# Patient Record
Sex: MALE | Race: ASIAN | NOT HISPANIC OR LATINO | URBAN - METROPOLITAN AREA
[De-identification: names, ages, dates, MRNs, and addresses within clinical notes are randomized per-mention and may not be internally consistent; named-entity substitution may affect disease eponyms.]

---

## 2019-03-27 ENCOUNTER — COMMUNICATION - HEALTHEAST (OUTPATIENT)
Dept: TELEHEALTH | Facility: CLINIC | Age: 44
End: 2019-03-27

## 2019-03-27 ENCOUNTER — COMMUNICATION - HEALTHEAST (OUTPATIENT)
Dept: FAMILY MEDICINE | Facility: CLINIC | Age: 44
End: 2019-03-27

## 2019-03-27 ENCOUNTER — OFFICE VISIT - HEALTHEAST (OUTPATIENT)
Dept: FAMILY MEDICINE | Facility: CLINIC | Age: 44
End: 2019-03-27

## 2019-03-27 ENCOUNTER — HOSPITAL ENCOUNTER (OUTPATIENT)
Dept: CT IMAGING | Facility: HOSPITAL | Age: 44
Discharge: HOME | End: 2019-03-27
Attending: FAMILY MEDICINE

## 2019-03-27 DIAGNOSIS — R05.9 COUGH: ICD-10-CM

## 2019-03-27 DIAGNOSIS — C62.90 MALIGNANT NEOPLASM OF TESTIS, UNSPECIFIED LATERALITY, UNSPECIFIED WHETHER DESCENDED OR UNDESCENDED: ICD-10-CM

## 2019-03-27 LAB
AFP SERPL-MCNC: 3 UG/ML
ALBUMIN SERPL-MCNC: 4 G/DL (ref 3.5–5)
ALP SERPL-CCNC: 56 U/L (ref 45–120)
ALT SERPL W P-5'-P-CCNC: 195 U/L (ref 0–45)
ANION GAP SERPL CALCULATED.3IONS-SCNC: 11 MMOL/L (ref 5–18)
AST SERPL W P-5'-P-CCNC: 116 U/L (ref 0–40)
BILIRUB SERPL-MCNC: 1 MG/DL (ref 0–1)
BUN SERPL-MCNC: 9 MG/DL (ref 8–22)
CALCIUM SERPL-MCNC: 9.4 MG/DL (ref 8.5–10.5)
CHLORIDE BLD-SCNC: 100 MMOL/L (ref 98–107)
CO2 SERPL-SCNC: 29 MMOL/L (ref 22–31)
CREAT SERPL-MCNC: 0.92 MG/DL (ref 0.7–1.3)
ERYTHROCYTE [DISTWIDTH] IN BLOOD BY AUTOMATED COUNT: 12.6 % (ref 11–14.5)
GFR SERPL CREATININE-BSD FRML MDRD: >60 ML/MIN/1.73M2
GLUCOSE BLD-MCNC: 77 MG/DL (ref 70–125)
HCT VFR BLD AUTO: 49 % (ref 40–54)
HGB BLD-MCNC: 16 G/DL (ref 14–18)
MCH RBC QN AUTO: 28.2 PG (ref 27–34)
MCHC RBC AUTO-ENTMCNC: 32.7 G/DL (ref 32–36)
MCV RBC AUTO: 86 FL (ref 80–100)
PLATELET # BLD AUTO: 158 THOU/UL (ref 140–440)
PMV BLD AUTO: 7.9 FL (ref 7–10)
POTASSIUM BLD-SCNC: 4.1 MMOL/L (ref 3.5–5)
PROT SERPL-MCNC: 6.9 G/DL (ref 6–8)
RBC # BLD AUTO: 5.69 MILL/UL (ref 4.4–6.2)
SODIUM SERPL-SCNC: 140 MMOL/L (ref 136–145)
WBC: 3.5 THOU/UL (ref 4–11)

## 2019-03-27 RX ORDER — TESTOSTERONE CYPIONATE 200 MG/ML
100 INJECTION, SOLUTION INTRAMUSCULAR
Status: SHIPPED | COMMUNITY
Start: 2019-03-08

## 2019-03-27 ASSESSMENT — MIFFLIN-ST. JEOR: SCORE: 2097.46

## 2019-03-28 ENCOUNTER — COMMUNICATION - HEALTHEAST (OUTPATIENT)
Dept: ONCOLOGY | Facility: HOSPITAL | Age: 44
End: 2019-03-28

## 2019-03-28 ENCOUNTER — AMBULATORY - HEALTHEAST (OUTPATIENT)
Dept: FAMILY MEDICINE | Facility: CLINIC | Age: 44
End: 2019-03-28

## 2019-03-28 DIAGNOSIS — R91.8 PULMONARY NODULES: ICD-10-CM

## 2019-03-29 ENCOUNTER — COMMUNICATION - HEALTHEAST (OUTPATIENT)
Dept: FAMILY MEDICINE | Facility: CLINIC | Age: 44
End: 2019-03-29

## 2019-03-31 ENCOUNTER — COMMUNICATION - HEALTHEAST (OUTPATIENT)
Dept: FAMILY MEDICINE | Facility: CLINIC | Age: 44
End: 2019-03-31

## 2019-04-01 ENCOUNTER — AMBULATORY - HEALTHEAST (OUTPATIENT)
Dept: ONCOLOGY | Facility: HOSPITAL | Age: 44
End: 2019-04-01

## 2019-04-10 ENCOUNTER — OFFICE VISIT - HEALTHEAST (OUTPATIENT)
Dept: ONCOLOGY | Facility: CLINIC | Age: 44
End: 2019-04-10

## 2019-04-10 DIAGNOSIS — R91.8 LUNG INFILTRATE ON CT: ICD-10-CM

## 2019-04-10 DIAGNOSIS — Z85.47 HISTORY OF PRIMARY TESTICULAR CANCER: ICD-10-CM

## 2019-04-10 DIAGNOSIS — R74.8 ELEVATED LIVER ENZYMES: ICD-10-CM

## 2019-04-10 ASSESSMENT — MIFFLIN-ST. JEOR: SCORE: 2081.01

## 2019-05-02 ENCOUNTER — AMBULATORY - HEALTHEAST (OUTPATIENT)
Dept: LAB | Facility: CLINIC | Age: 44
End: 2019-05-02

## 2019-05-02 DIAGNOSIS — E29.1 MALE HYPOGONADISM: ICD-10-CM

## 2019-05-03 ENCOUNTER — HOSPITAL ENCOUNTER (OUTPATIENT)
Dept: CT IMAGING | Facility: CLINIC | Age: 44
Setting detail: RADIATION/ONCOLOGY SERIES
Discharge: STILL ADMITTED OR ADMINISTRATIVE | End: 2019-05-03
Attending: INTERNAL MEDICINE

## 2019-05-03 ENCOUNTER — AMBULATORY - HEALTHEAST (OUTPATIENT)
Dept: INFUSION THERAPY | Facility: CLINIC | Age: 44
End: 2019-05-03

## 2019-05-03 DIAGNOSIS — R91.8 LUNG INFILTRATE ON CT: ICD-10-CM

## 2019-05-03 DIAGNOSIS — R74.8 ELEVATED LIVER ENZYMES: ICD-10-CM

## 2019-05-03 DIAGNOSIS — E29.1 MALE HYPOGONADISM: ICD-10-CM

## 2019-05-03 DIAGNOSIS — Z85.47 HISTORY OF PRIMARY TESTICULAR CANCER: ICD-10-CM

## 2019-05-03 LAB
ALBUMIN SERPL-MCNC: 4 G/DL (ref 3.5–5)
ALP SERPL-CCNC: 50 U/L (ref 45–120)
ALT SERPL W P-5'-P-CCNC: 36 U/L (ref 0–45)
ANION GAP SERPL CALCULATED.3IONS-SCNC: 8 MMOL/L (ref 5–18)
AST SERPL W P-5'-P-CCNC: 18 U/L (ref 0–40)
BASOPHILS # BLD AUTO: 0 THOU/UL (ref 0–0.2)
BASOPHILS NFR BLD AUTO: 0 % (ref 0–2)
BILIRUB SERPL-MCNC: 1 MG/DL (ref 0–1)
BUN SERPL-MCNC: 8 MG/DL (ref 8–22)
CALCIUM SERPL-MCNC: 9.9 MG/DL (ref 8.5–10.5)
CHLORIDE BLD-SCNC: 103 MMOL/L (ref 98–107)
CO2 SERPL-SCNC: 29 MMOL/L (ref 22–31)
CREAT SERPL-MCNC: 0.83 MG/DL (ref 0.7–1.3)
EOSINOPHIL # BLD AUTO: 0.1 THOU/UL (ref 0–0.4)
EOSINOPHIL NFR BLD AUTO: 1 % (ref 0–6)
ERYTHROCYTE [DISTWIDTH] IN BLOOD BY AUTOMATED COUNT: 13.4 % (ref 11–14.5)
FASTING STATUS PATIENT QL REPORTED: YES
GFR SERPL CREATININE-BSD FRML MDRD: >60 ML/MIN/1.73M2
GLUCOSE BLD-MCNC: 96 MG/DL (ref 70–125)
HBV CORE AB SERPL QL IA: NEGATIVE
HBV SURFACE AG SERPL QL IA: NEGATIVE
HCT VFR BLD AUTO: 46.9 % (ref 40–54)
HCV AB SERPL QL IA: NEGATIVE
HDLC SERPL-MCNC: 44 MG/DL
HEPATITIS B SURFACE ANTIBODY LHE- HISTORICAL: POSITIVE
HGB BLD-MCNC: 15.2 G/DL (ref 14–18)
LDH SERPL L TO P-CCNC: 128 U/L (ref 125–220)
LYMPHOCYTES # BLD AUTO: 2 THOU/UL (ref 0.8–4.4)
LYMPHOCYTES NFR BLD AUTO: 31 % (ref 20–40)
MCH RBC QN AUTO: 28.6 PG (ref 27–34)
MCHC RBC AUTO-ENTMCNC: 32.4 G/DL (ref 32–36)
MCV RBC AUTO: 88 FL (ref 80–100)
MONOCYTES # BLD AUTO: 0.5 THOU/UL (ref 0–0.9)
MONOCYTES NFR BLD AUTO: 7 % (ref 2–10)
NEUTROPHILS # BLD AUTO: 4 THOU/UL (ref 2–7.7)
NEUTROPHILS NFR BLD AUTO: 61 % (ref 50–70)
PLATELET # BLD AUTO: 222 THOU/UL (ref 140–440)
PMV BLD AUTO: 9.7 FL (ref 8.5–12.5)
POTASSIUM BLD-SCNC: 4.4 MMOL/L (ref 3.5–5)
PROT SERPL-MCNC: 7.5 G/DL (ref 6–8)
PSA SERPL-MCNC: 1.9 NG/ML (ref 0–2.5)
RBC # BLD AUTO: 5.32 MILL/UL (ref 4.4–6.2)
SODIUM SERPL-SCNC: 140 MMOL/L (ref 136–145)
WBC: 6.6 THOU/UL (ref 4–11)

## 2019-05-06 LAB — HCG-TM SERPL-ACNC: <3 IU/L

## 2019-05-07 ENCOUNTER — OFFICE VISIT - HEALTHEAST (OUTPATIENT)
Dept: ONCOLOGY | Facility: CLINIC | Age: 44
End: 2019-05-07

## 2019-05-07 DIAGNOSIS — Z85.47 HISTORY OF PRIMARY TESTICULAR CANCER: ICD-10-CM

## 2019-05-07 DIAGNOSIS — R91.8 LUNG INFILTRATE ON CT: ICD-10-CM

## 2019-05-07 DIAGNOSIS — R74.8 ELEVATED LIVER ENZYMES: ICD-10-CM

## 2019-05-07 LAB
SHBG SERPL-SCNC: 27 NMOL/L (ref 11–80)
TESTOST FREE SERPL-MCNC: 10.76 NG/DL (ref 4.7–24.4)
TESTOST SERPL-MCNC: 464 NG/DL (ref 240–950)

## 2019-11-20 ENCOUNTER — OFFICE VISIT - HEALTHEAST (OUTPATIENT)
Dept: FAMILY MEDICINE | Facility: CLINIC | Age: 44
End: 2019-11-20

## 2019-11-20 DIAGNOSIS — K64.4 EXTERNAL HEMORRHOIDS: ICD-10-CM

## 2019-11-20 DIAGNOSIS — R53.82 CHRONIC FATIGUE: ICD-10-CM

## 2019-11-20 LAB
ANION GAP SERPL CALCULATED.3IONS-SCNC: 11 MMOL/L (ref 5–18)
BUN SERPL-MCNC: 8 MG/DL (ref 8–22)
CALCIUM SERPL-MCNC: 9.5 MG/DL (ref 8.5–10.5)
CHLORIDE BLD-SCNC: 99 MMOL/L (ref 98–107)
CO2 SERPL-SCNC: 29 MMOL/L (ref 22–31)
CREAT SERPL-MCNC: 0.95 MG/DL (ref 0.7–1.3)
ERYTHROCYTE [DISTWIDTH] IN BLOOD BY AUTOMATED COUNT: 13.8 % (ref 11–14.5)
GFR SERPL CREATININE-BSD FRML MDRD: >60 ML/MIN/1.73M2
GLUCOSE BLD-MCNC: 80 MG/DL (ref 70–125)
HCT VFR BLD AUTO: 50 % (ref 40–54)
HGB BLD-MCNC: 17.1 G/DL (ref 14–18)
MCH RBC QN AUTO: 29.2 PG (ref 27–34)
MCHC RBC AUTO-ENTMCNC: 34.2 G/DL (ref 32–36)
MCV RBC AUTO: 85 FL (ref 80–100)
PLATELET # BLD AUTO: 234 THOU/UL (ref 140–440)
PMV BLD AUTO: 7.6 FL (ref 7–10)
POTASSIUM BLD-SCNC: 4.2 MMOL/L (ref 3.5–5)
RBC # BLD AUTO: 5.86 MILL/UL (ref 4.4–6.2)
SODIUM SERPL-SCNC: 139 MMOL/L (ref 136–145)
TSH SERPL DL<=0.005 MIU/L-ACNC: 2.35 UIU/ML (ref 0.3–5)
WBC: 9.1 THOU/UL (ref 4–11)

## 2019-11-22 ENCOUNTER — COMMUNICATION - HEALTHEAST (OUTPATIENT)
Dept: FAMILY MEDICINE | Facility: CLINIC | Age: 44
End: 2019-11-22

## 2020-03-20 ENCOUNTER — COMMUNICATION - HEALTHEAST (OUTPATIENT)
Dept: ADMINISTRATIVE | Facility: HOSPITAL | Age: 45
End: 2020-03-20

## 2020-05-11 ENCOUNTER — COMMUNICATION - HEALTHEAST (OUTPATIENT)
Dept: ADMINISTRATIVE | Facility: HOSPITAL | Age: 45
End: 2020-05-11

## 2020-07-21 ENCOUNTER — COMMUNICATION - HEALTHEAST (OUTPATIENT)
Dept: FAMILY MEDICINE | Facility: CLINIC | Age: 45
End: 2020-07-21

## 2020-07-23 ENCOUNTER — OFFICE VISIT - HEALTHEAST (OUTPATIENT)
Dept: FAMILY MEDICINE | Facility: CLINIC | Age: 45
End: 2020-07-23

## 2020-07-23 DIAGNOSIS — K64.9 HEMORRHOIDS, UNSPECIFIED HEMORRHOID TYPE: ICD-10-CM

## 2020-09-15 ENCOUNTER — RECORDS - HEALTHEAST (OUTPATIENT)
Dept: ADMINISTRATIVE | Facility: OTHER | Age: 45
End: 2020-09-15

## 2021-03-19 ENCOUNTER — COMMUNICATION - HEALTHEAST (OUTPATIENT)
Dept: ADMINISTRATIVE | Facility: HOSPITAL | Age: 46
End: 2021-03-19

## 2021-05-27 NOTE — CONSULTS
[facility]ematology [facility] Oncology Consult Note    Patient: Allan Moran  MRN: [id number]  Date of Service: 04/10/2019        Reason for Visit    I was consulted by Dr. Larry regarding testicular cancer.    Assessment/Plan    Mr. Allan Moran is a 43 y.o. 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  He received 3 cycles of cisplatin and etoposide chemotherapy as adjuvant treatment but did not take the fourth cycle because he found it difficult to tolerate.  He was being followed up initially in [place] and later with [place] [facility] and in 2013 with no evidence of recurrence.  He now presents with complaints of a cough that has been going on for the last 3 months with some shortness of breath.  He also had some low-grade fevers and night sweats in February which have now subsided.  I have gone through the available previous medical records from the [facility].  I have reviewed his labs and I have personally reviewed the images of the CT scan of the chest.    1.  I discussed with the patient that he has done very well from the testicular cancer point of view.  He is now about 14 years out from the diagnosis.  While I cannot say that the chance of recurrence of the cancer is 0, it is very close to that.  At this point at the most what I would recommend would be follow-up once a year for the testicular cancer, which if he is interested I can take care of.  He was agreeable to the plan.    2.  The real issue in today's appointment are the lung infiltrates seen in the CT scan of the chest done on 3/27/19.  Reviewed the images of the CT scan of the chest with him and his wife.  He has scattered groundglass opacities bilaterally.  Another issue is the liver enzyme elevation noted in the CMP from 3/27/19.  When I look through his  previous records the last time he has had liver enzymes checked was in 2012 and that they were normal.  I agree with the patient that we cannot just attribute this to a fatty liver.  From the history that he gives me, I suspect that he had some sort of respiratory infection in January to March time and he is recovering slowly from that.  He probably has a residual cough and infiltrates in the CT scan from the treated respiratory infection.  That may explain the liver enzyme elevation also.  We discussed about how to manage this.  He already has a referral to pulmonary but he has not yet made that appointment.  Finally he agreed to follow-up with me again in about 3 weeks time with a repeat CT scan and labs.    3.  I have ordered a CT scan of the chest abdomen and pelvis with oral and IV contrast to be done in 3 weeks.  When he comes for the CT scan to have the following labs drawn: CBC differential platelets, CMP, LDH, beta hCG, hepatitis B surface antigen, hepatitis C antibody and hepatitis B chronic evaluation.  He will see me in follow-up a day or 2 after the CT scan is done.      ECOG Performance   ECOG Performance Status: 1(past few months due to back pain and cough)  Distress Assessment  Distress Assessment Score: 1        Problem List    1. History of primary testicular cancer  HM1(CBC and Differential)    Comprehensive Metabolic Panel    Lactate Dehydrogenase (LDH)    Beta-hCG Tumor Marker    CT Chest Abdomen Pelvis With Oral With IV Cont   2. Lung infiltrate on CT  HM1(CBC and Differential)    Comprehensive Metabolic Panel    Lactate Dehydrogenase (LDH)    Beta-hCG Tumor Marker    CT Chest Abdomen Pelvis With Oral With IV Cont   3. Elevated liver enzymes  HM1(CBC and Differential)    Comprehensive Metabolic Panel    Lactate Dehydrogenase (LDH)    CT Chest Abdomen Pelvis With Oral With IV Cont    Hepatitis B Surface Antigen (HBsAG)    Hepatitis C Antibody (Anti-HCV)    Hepatitis B Chronic Evaluation            CC: Sharona Larry MD      ______________________________________________________________________________      History    Mr. Allan Moran is a 43-year-old gentleman who had a testicular cancer, which he remembers is on the right side that was diagnosed in May 2005.  Apparently it was his wife who noticed the swelling.  He has a history of having surgery done for an undescended testis when he was 3 years old in [facility].  He does not know exactly whether it was an orchiopexy or orchiectomy that was done.  He believes it was on the left side.  In any case he had the right-sided radical orchiectomy done on the Tuesday after Memorial Day in 2005 (May 31, 2005).  This was in [facility].  We do not have the original records from that time.  According to what he and his wife can remember this was a mixed nonseminomatous germ cell tumor.  According to the records we have available from [place] [facility] this comprised of 90% embryonal carcinoma and a 10% teratoma with him approximately adenocarcinoma in the vascular spaces.  He was recommended to have 4 cycles of -16 and cisplatin.  He received 3 cycles and then decided to stop chemotherapy because he found it difficult to tolerate due to dizziness and low blood pressure.  He was trying to work through all this time.  He had a follow-up on a regular basis when he was in [place].  After he and his family moved to [place], he followed up with [place] [facility] and 2013 with no evidence of recurrence.    He says that he started feeling unwell in January of this year.  He believes he had the flu in January.  He feels that he had other respiratory infection in early March.  He has been coughing since January.  He has been treated with one course of Augmentin in January and then again with a Z-Cabrera in March.  He went to the urgent care a couple weeks ago and was given a course of prednisone which she took only 2 tablets.    He says that  the cough was mostly in the morning.  He is still coughing but less intensely.  He does bring up some phlegm which is yellowish in the morning but then clears up later in the day.  He did have some shortness of breath that is more noticeable in January to February.  Breathing is improved now.  Occasionally he has had some mild wheezing.  He says it in the very beginning there was a little bit of blood streaking in the phlegm but that is not there now anymore.  He thinks that he had some low-grade fevers and night sweats in February which have gone away now.    No lumps or bumps anywhere.  No aches or pains anywhere.  No unusual headaches.  No eyesight problems.  No mouth sores.  No swallowing difficulty.  No nausea or vomiting.  No abdominal pain.  No chest pain.  No palpitation.  No constipation or diarrhea.  No blood in stool or black stools.  No difficulty with urination.  No skin rashes.  No numbness or tingling.    Please see below.  A 14 point review of system is otherwise completely negative.    Past History  Past Medical History:   Diagnosis Date     Dyslipidemia      Erectile dysfunction      Obstructive sleep apnea 2008     Seasonal allergies      Undescended testes     removed at age 3.     Past Surgical History:   Procedure Laterality Date     CYSTOSCOPY  2007     benign bladder polyp,status post resection     EXCISION BACK CYST AND CHEST NEVUS  02/06/2015     RADICAL ORCHIECTOMY Right 05/31/2005     UNDESCENDED TESTICLE EXPLORATION      Age 3.  Removal of undescended testis.     WISDOM TOOTH EXTRACTION  2009     Family History   Problem Relation Age of Onset     Arthritis Mother      Heart disease Father      No Medical Problems Sister      No Medical Problems Son      No Medical Problems Son      Other Daughter 13        Novak syndrome.     No Medical Problems Sister      Social History     Socioeconomic History     Marital status:      Spouse name: None     Number of children: None     Years of  education: None     Highest education level: None   Occupational History     Occupation:  for a [employer].   Social Needs     Financial resource strain: None     Food insecurity:     Worry: None     Inability: None     Transportation needs:     Medical: None     Non-medical: None   Tobacco Use     Smoking status: Former Smoker     Packs/day: 0.10     Years: 2.00     Pack years: 0.20     Types: Cigarettes     Smokeless tobacco: Never Used   Substance and Sexual Activity     Alcohol use: No     Drug use: No     Sexual activity: None   Lifestyle     Physical activity:     Days per week: None     Minutes per session: None     Stress: None   Relationships     Social connections:     Talks on phone: None     Gets together: None     Attends Nondenominational service: None     Active member of club or organization: None     Attends meetings of clubs or organizations: None     Relationship status: None     Intimate partner violence:     Fear of current or ex partner: None     Emotionally abused: None     Physically abused: None     Forced sexual activity: None   Other Topics Concern     None   Social History Narrative    Born in [facility] in [facility].  Came to the [place] when he was 6 years old.     Allergies    No Known Allergies    Review of Systems    General  General (WDL): Exceptions to WDL  Fatigue: None  Fever: None  Generalized Muscle Weakness: None  Weight Loss: No  ENT  ENT (WDL): Exceptions to WDL  Vertigo (Dizziness): None  Changes in vision: None  Glasses or Contacts: Yes - Chronic (Greater than 3 months)  Hearing loss: None  Hearing Aids: None  Tinnitus: None  Pain/Pressure in ears: None  Sinus Congestion/Drainage: Yes - Recent (Less than 3 months)  Sore Throat: None  Dental Problems: None  Dentures: None  Respiratory  Respiratory (WDL): Exceptions to WDL  Dyspnea: None  hemoptysis: None  Is patient on O2?: None  Cough: Yes - Chronic (Greater than 3 months)  Non-Cardiac Chest Pain:  "None  Cardiovascular  Cardiovascular (WDL): All cardiovascular elements are within defined limits  Endocrine  Endocrine (WDL): All endocrine elements are within defined limits  Gastrointestinal  Gastrointestinal (WDL): Exceptions to WDL  Difficulty Swallowing: None  Heartburn: None  Constipation: None  Yellowish skin and/or eyes: None  Blood from rectum: None  Nausea and Vomiting: None  Abdominal Pain: None  Diarrhea: None  Have had black or tan stools?: None  Hemorrhoids: Yes - Recent (Less than 3 months)  Poor Appetite: None  Musculoskeletal  Musculoskeletal (WDL): All musculoskeletal elements are within defined limits  Range of Motion Limitation: None  Joint pain: None  Back Pain: Yes - Chronic (Greater than 3 months)  Activity Assistance: None  Difficulty to lie flat for more than 30 minutes: None  Pain interfering with walking: Yes - Recent (Less than 3 months)  Muscle pain or stiffness: Yes - Recent (Less than 3 months)  Recent fall: None  Assistive device: None  Neurological  Neurological (WDL): All neurological elements are within defined limits  History of LOC?: None  Headaches: None  Difficulty walking: None  Difficulty with speech: None  Difficulty with memory: None  Vertigo (Dizziness): None  Dominant Hand: Right  Seizures: None  Difficulty with Balance: None  Numbness and/or tingling: None  Psychological/Emotional  Psychological/Emotional (WDL): All psychological/emotional elements are within defined limits  Hematological/Lymphatic  Hematological/Lymphatic (WDL): All hematological/lymphatic elements are within defined limits  Dermatological  Dermatologic (WDL): All dermatological elements are within defined limits  Genitourinary/Reproductive  Genitourinary/Reproductive (WDL): All genitourinary/reproductive elements are within defined limits  Reproductive (Females only)     Pain  Currently in Pain: No/denies    Physical Exam    Recent Vitals 4/10/2019   Height 6' 0\"   Weight 257 lbs 8 oz   BSA (m2) 2.44 " m2   /65   Pulse 91   Temp 97.9   Temp src 1   SpO2 93   Some recent data might be hidden       GENERAL: Alert and oriented. Seated comfortably. In no distress.  Heavy build.    HEAD: Atraumatic and normocephalic.  He has male pattern baldness.    EYES: MCKENZIE, EOMI.  No pallor.  No icterus.    Oral cavity: no mucosal lesion or tonsillar enlargement.    NECK: supple. JVP normal.  No thyroid enlargement.    LYMPH NODES: No palpable, cervical, axillary or inguinal lymphadenopathy.    CHEST: clear to auscultation bilaterally.  Resonant to percussion throughout bilaterally.  Symmetrical breath movements bilaterally.    CVS: S1 and S2 are heard. Regular rate and rhythm.  No murmur or gallop or rub heard.    ABDOMEN: Soft. Not tender. Not distended.  No palpable hepatomegaly or splenomegaly.  No other mass palpable.  Bowel sounds heard.  Internal genitalia: No evidence of hernia in the inguinal regions.  Uncircumcised penis.  Bilateral scrotum appears empty to palpation.    EXTREMITIES: Warm.  No peripheral edema.    SKIN: no rash, or bruising or purpura.      Lab Results    No visits with results within 2 Week(s) from this visit.   Latest known visit with results is:   Office Visit on 03/27/2019   Component Date Value Ref Range Status     WBC 03/27/2019 3.5* 4.0 - 11.0 thou/uL Final     RBC 03/27/2019 5.69  4.40 - 6.20 mill/uL Final     Hemoglobin 03/27/2019 16.0  14.0 - 18.0 g/dL Final     Hematocrit 03/27/2019 49.0  40.0 - 54.0 % Final     MCV 03/27/2019 86  80 - 100 fL Final     MCH 03/27/2019 28.2  27.0 - 34.0 pg Final     MCHC 03/27/2019 32.7  32.0 - 36.0 g/dL Final     RDW 03/27/2019 12.6  11.0 - 14.5 % Final     Platelets 03/27/2019 158  140 - 440 thou/uL Final     MPV 03/27/2019 7.9  7.0 - 10.0 fL Final     Sodium 03/27/2019 140  136 - 145 mmol/L Final     Potassium 03/27/2019 4.1  3.5 - 5.0 mmol/L Final     Chloride 03/27/2019 100  98 - 107 mmol/L Final     CO2 03/27/2019 29  22 - 31 mmol/L Final     Anion  Gap, Calculation 03/27/2019 11  5 - 18 mmol/L Final     Glucose 03/27/2019 77  70 - 125 mg/dL Final     BUN 03/27/2019 9  8 - 22 mg/dL Final     Creatinine 03/27/2019 0.92  0.70 - 1.30 mg/dL Final     GFR MDRD Af Amer 03/27/2019 >60  >60 mL/min/1.73m2 Final     GFR MDRD Non Af Amer 03/27/2019 >60  >60 mL/min/1.73m2 Final     Bilirubin, Total 03/27/2019 1.0  0.0 - 1.0 mg/dL Final     Calcium 03/27/2019 9.4  8.5 - 10.5 mg/dL Final     Protein, Total 03/27/2019 6.9  6.0 - 8.0 g/dL Final     Albumin 03/27/2019 4.0  3.5 - 5.0 g/dL Final     Alkaline Phosphatase 03/27/2019 56  45 - 120 U/L Final     AST 03/27/2019 116* 0 - 40 U/L Final     ALT 03/27/2019 195* 0 - 45 U/L Final     AFP-Tumor Marker 03/27/2019 3.0  <=8.5 ug/mL Final         Imaging Results    Ct Chest With Contrast    Result Date: 3/27/2019  EXAM: CT CHEST W CONTRAST LOCATION: [facility] DATE/TIME: 3/27/2019 4:17 PM INDICATION: Cough and inflammation for three months with HX testicular CA COMPARISON: Chest x-ray 03/22/2019 TECHNIQUE: Helical images were obtained through the chest during injection of IV contrast. Multiplanar reformats were obtained. Dose reduction techniques were used. IV CONTRAST: Iohexol (Omni) 90 mL FINDINGS: LUNGS AND PLEURA: Numerous small scattered groundglass opacities throughout both upper and lower lobes. No effusions. No evidence for septal thickening or bronchiectasis. No significant air trapping. MEDIASTINUM: Small calcified subcarinal lymph node. No enlarged mediastinal or hilar lymph nodes. Prominent collateral vessels within the anterior mediastinum. LIMITED UPPER ABDOMEN: Diffuse fatty infiltration of the liver. MUSCULOSKELETAL: Negative.     CONCLUSION: 1.  Numerous scattered groundglass nodules throughout both lungs concerning for pneumonia or inflammatory process. Recommend follow-up CT chest exam after therapy to assess for resolution. 2.  No adenopathy in the chest. 3.  Fatty liver.        Signed by: Paula Nova  MD

## 2021-05-27 NOTE — PROGRESS NOTES
Please tell pt no cancer seen on CT but did see inflammation.  I'd  like him to f/u with pulmonology.  Order in.

## 2021-05-27 NOTE — TELEPHONE ENCOUNTER
----- Message from Sharona Larry MD sent at 3/28/2019  8:11 AM CDT -----  Please tell pt no cancer seen on CT but did see inflammation.  I'd  like him to f/u with pulmonology.  Order in.

## 2021-05-27 NOTE — TELEPHONE ENCOUNTER
Allan was referred over for his hx of testicular cancer.  He was dx in 2005 at [facility] in [facility] with an orchiectomy.  He went on to have chemotherapy.  He followed up at [facility] [place] and then on to [place] [facility].  PCP is referring over.  I reviewed my role and encouraged he call me if needed for assistance.

## 2021-05-27 NOTE — PROGRESS NOTES
Allan requested that he see Dr. Nova instead of Dr. Oglesby as he is covered by his insurance plan.  I have changed his appt.  I let him know via email we need his LETICIA asa to get his old records.  Payal Talley has been working on retrieving from 2005, his orchiectomy and chemotherapy.  She was told the [facility] no longer has these records.  She will proceed with getting [place] [facility] records.    [address] [place] [address]  [phone]  Dr Balbir Min, Oncologist       Seamus Vieira MD - Urologist   [phone]   no

## 2021-05-27 NOTE — PROGRESS NOTES
Chief Complaint   Patient presents with     URI     Pt c/o off and on coughing up bloody mucous, seen Friday at urgent care in [facility]. He was given Prednisone and a Z judd. He does not feel the prednisone helped him at all. He states he did not finish it. He did have a chest xray which showed negative for pneumonia.        HPI: Complex patient presents new to our clinic with history of proximally 3 months of cough.  Old records from [facility] dated 3/22/2019 shows that he had inflammation and bronchial thickening and lung inflammation.  He also has noted to these had episodes of hemoptysis, 6 pounds of weight loss.  His past history is reviewed notable for testicular cancer.  His concern is that his cough is not going away and that he may have something more serious going on in his lung.  Fortunately he does not smoke.    ROS: 10 point system review complete notable for nausea bloody mucus and frequent colds and occasional back pain.    SH:    reports that he has quit smoking. He does not have any smokeless tobacco history on file. He reports that he does not drink alcohol or use drugs.      FH: Reviewed notable for cancer in himself only    Meds:  Allan has a current medication list which includes the following prescription(s): testosterone cypionate and prednisone.    O:  /84   Pulse 93   Temp 98.4  F (36.9  C)   Resp 16   Ht 6' (1.829 m)   Wt (!) 261 lb 2 oz (118.4 kg)   SpO2 95%   BMI 35.41 kg/m     Constitutional:    --Vitals as above  --No acute distress  Eyes-  --Sclera noninjected  --Lids and conjunctiva normal  ENT-  --TMs clear  --Sclera noninjected  --Pharynx not erythematous  Neck-  --Neck supple with no cervical lymphadenopathy  Lungs-  --Clear to Auscultation  Heart-  --Regular rate and rhythm  Abdomen--  --Soft, non-tender, non-distended  Skin-  --Pink and dry  Psych-  --Alert and oriented  --Normal affect      A/P:   1. Cough  The patient has chronic cough with hemoptysis.  This,  along with 6 pound weight loss is concerning.  Because of his past history of abnormal chest x-ray will obtain CT scan of the chest.  - CT Chest With Contrast; Future  - HM2(CBC w/o Differential)  - Comprehensive Metabolic Panel    2. Malignant neoplasm of testis, unspecified laterality, unspecified whether descended or undescended (H)  Referral to oncology due to history of testicular cancer and will check AFP.  - Ambulatory referral to Oncology  - AFP-Tumor Marker

## 2021-05-28 NOTE — PROGRESS NOTES
Patient is here for follow-up of testicular cancer.   Had labs and CT scan done.  Accompanied by spouse, Kristin.  Mary Carmen Oshea RN

## 2021-05-28 NOTE — PATIENT INSTRUCTIONS - HE
EXAM: CT CHEST ABDOMEN PELVIS W ORAL W IV CONTRAST  LOCATION: [facility]  DATE/TIME: 5/3/2019 10:03 AM     INDICATION: Neoplasm: testicular, recurrence, suspected/known Patient with a history of testicular cancer resected in 2005. Now with bilateral lung infiltrates and LFT elevation.  COMPARISON: 3/27/2019 chest CT.  TECHNIQUE: Helical thin-section CT scan of the chest, abdomen, and pelvis was performed before and after injection of IV contrast. Multiplanar reformats were obtained. Dose reduction techniques were used.   CONTRAST: Iohexol (Omni) 100 mL.     FINDINGS:      CHEST: Prior bilateral irregular pulmonary opacities have essentially resolved (a few subtle regions of groundglass remain). No new pulmonary parenchymal abnormality. No adenopathy. No effusions.     ABDOMEN: Low-density liver without focal abnormality. Unremarkable gallbladder, pancreas, spleen and adrenals. No free air or adenopathy. Nonaneurysmal aorta with trace plaque.     PELVIS: Colonic diverticulosis. Normal appendix. No free fluid or adenopathy.     MUSCULOSKELETAL: No focal bony lesion. Couple tiny sclerotic foci are likely bone islands.     IMPRESSION:   CONCLUSION:     1.  Near-complete resolution of prior presumed infectious / inflammatory pulmonary opacities.     2.  No adenopathy in the chest, abdomen or pelvis.     3.  Diffuse hepatic steatosis. No focal lesion.

## 2021-05-28 NOTE — PROGRESS NOTES
[facility] Hematology and Oncology Progress Note    Patient: Allan Moran  MRN: [id number]  Date of Service: 05/07/2019        Reason for Visit    Follow-up regarding testicular cancer.    Assessment and Plan      ECOG Performance   ECOG Performance Status: 0    Distress Assessment  Distress Assessment Score: No distress    Pain   : None.    Mr. Allan Moran is a 43 y.o. 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  He was treated in [facility].  He had a radical orchiectomy done on 5/31/2005.  He received 3 cycles of cisplatin and etoposide chemotherapy as adjuvant treatment but did not take the fourth cycle because he found it difficult to tolerate.  He was being followed up in [place] and later with [place] oncology until 2013 with no evidence of recurrence.    He presented to [facility] oncology April 10, 2013.  He was complaining of a cough that was going on for 3 months and some shortness of breath.  He also gave a history of some low-grade fevers and night sweats in February which had now subsided.  He had had a CT scan of the chest done on 3/27/2019 which showed scattered bilateral groundglass opacities in the lungs.  He also had a mild liver enzyme elevation in the CMP done on 3/27/2019.    1.  He appears to be doing well overall.  The cough has cleared.  No longer has any fevers or night sweats.  He has gained weight.  Energy is good.    2.  I reviewed the images and the report of the CT scan of the chest abdomen and pelvis that was done on 5/3/2019 with the patient and his wife.  The groundglass opacities in the lungs have almost completely cleared.  There is really nothing else concerning found in the CT scan that looks like cancer recurrence or otherwise.  He was very glad to see the results.    3.  We then reviewed his labs from May 3.  Blood counts are essentially normal.   Metabolic panel was also normal.  The liver enzyme elevation has resolved.  Hepatitis B and C serologies have come back negative.  He does have antibodies to hepatitis B which likely reflects either exposure in the past or immunization.  He is not sure about that.  Tumor markers have all come back normal.  He was very glad to see the results.    4.  I discussed with him that I do not see any evidence of cancer recurrence.  I suspect that the groundglass opacities in the lung and the mild liver enzyme elevations that he had likely signify that he had some sort of respiratory infection which caused the groundglass opacities in the lungs.  This could have been a viral pneumonia.  Anyway it appears to have cleared.  I do not think any further treatment for that is needed at this time.    5.  I discussed with him that now is the time to focus on the rest of his health.  I think he needs to lose some weight for his overall health.  It is notable that there is a appearance of fatty liver in the CT scan.  I encouraged him to start exercising.  Encouraged him to follow-up with primary care for managing his overall health.    6.  I discussed with him about follow-up for the testicular cancer.  I told him that at this point the chance of recurrence of testicular cancer is quite low.  If he wants he can follow-up annually with his PCP.  If he wants he can go for an annual follow-up with oncology also.  He wanted to have an annual oncology follow-up for the testicular cancer.  We cannot do that for him.  Return to clinic in a years time to have labs drawn: CBC differential platelets, CMP, AFP level, LDH and beta hCG level.  To see me or Juan Francisco Haile NP in follow-up a week later.    Time spend >25 minutes face to face with the patient. More than 50 % in counseling and coordination of care.      Problem List    1. History of primary testicular cancer  HM1(CBC and Differential)    Comprehensive Metabolic Panel    Lactate  Dehydrogenase (LDH)    AFP-Tumor Marker    Beta-hCG Tumor Marker (HCGTM)   2. Lung infiltrate on CT     3. Elevated liver enzymes          CC: Sharona Larry MD    ______________________________________________________________________________    History of Present Illness    Mr. Allan Moran is here for follow-up with his wife.  He says that he is doing well overall.  The cough is completely clear.  Fatigue is gone.  He does not have any aches or pains anywhere.  No fevers or night sweats.  No lumps or bumps.  He is eating well.  I noticed that he has gained some weight.  He is just a little bit anxious to see the results of the CT scan and the blood work.    Please see below.  A 14 point review of system is otherwise completely negative.    Pain Status  Currently in Pain: No/denies    Review of Systems    Constitutional  Constitutional (WDL): All constitutional elements are within defined limits  Neurosensory  Neurosensory (WDL): All neurosensory elements are within defined limits  Cardiovascular  Cardiovascular (WDL): All cardiovascular elements are within defined limits  Pulmonary  Respiratory (WDL): Within Defined Limits  Gastrointestinal  Gastrointestinal (WDL): All gastrointestinal elements are within defined limits  Genitourinary  Genitourinary (WDL): All genitourinary elements are within defined limits  Integumentary  Integumentary (WDL): All integumentary elements are within defined limits  Patient Coping  Patient Coping: Open/discussion  Distress Assessment  Distress Assessment Score: No distress  Accompanied by  Accompanied by: Family Member    Past History  Past Medical History:   Diagnosis Date     Dyslipidemia      Erectile dysfunction      Obstructive sleep apnea 2008     Seasonal allergies      Undescended testes     removed at age 3.         Past Surgical History:   Procedure Laterality Date     CYSTOSCOPY  2007     benign bladder polyp,status post resection     EXCISION BACK CYST AND CHEST  NEVUS  02/06/2015     RADICAL ORCHIECTOMY Right 05/31/2005     UNDESCENDED TESTICLE EXPLORATION      Age 3.  Removal of undescended testis.     WISDOM TOOTH EXTRACTION  2009       Physical Exam    Recent Vitals 5/7/2019   Height -   Weight 263 lbs 10 oz   BSA (m2) 2.46 m2   /74   Pulse 86   Temp 98.7   Temp src 1   SpO2 95   Some recent data might be hidden       GENERAL: Alert and oriented. Seated comfortably. In no distress.  Heavyset.    HEAD: Atraumatic and normocephalic.      EYES: MCKENZIE, EOMI.  No pallor.  No icterus.    Oral cavity: no mucosal lesion or tonsillar enlargement.    NECK: supple. JVP normal.  No thyroid enlargement.    LYMPH NODES: No palpable, cervical, axillary or inguinal lymphadenopathy.    ABDOMEN: Soft. Not tender. Not distended.  No palpable hepatomegaly or splenomegaly.      EXTREMITIES: Warm.  No peripheral edema.    SKIN: no rash, or bruising or purpura.  He has male pattern baldness.        Lab Results    Recent Results (from the past 168 hour(s))   Comprehensive Metabolic Panel   Result Value Ref Range    Sodium 140 136 - 145 mmol/L    Potassium 4.4 3.5 - 5.0 mmol/L    Chloride 103 98 - 107 mmol/L    CO2 29 22 - 31 mmol/L    Anion Gap, Calculation 8 5 - 18 mmol/L    Glucose 96 70 - 125 mg/dL    BUN 8 8 - 22 mg/dL    Creatinine 0.83 0.70 - 1.30 mg/dL    GFR MDRD Af Amer >60 >60 mL/min/1.73m2    GFR MDRD Non Af Amer >60 >60 mL/min/1.73m2    Bilirubin, Total 1.0 0.0 - 1.0 mg/dL    Calcium 9.9 8.5 - 10.5 mg/dL    Protein, Total 7.5 6.0 - 8.0 g/dL    Albumin 4.0 3.5 - 5.0 g/dL    Alkaline Phosphatase 50 45 - 120 U/L    AST 18 0 - 40 U/L    ALT 36 0 - 45 U/L   Lactate Dehydrogenase (LDH)   Result Value Ref Range    LD (LDH) 128 125 - 220 U/L   HDL Cholesterol   Result Value Ref Range    HDL Cholesterol 44 >=40 mg/dL    Patient Fasting > 8hrs? Yes    HM1 (CBC with Diff)   Result Value Ref Range    WBC 6.6 4.0 - 11.0 thou/uL    RBC 5.32 4.40 - 6.20 mill/uL    Hemoglobin 15.2 14.0 - 18.0  g/dL    Hematocrit 46.9 40.0 - 54.0 %    MCV 88 80 - 100 fL    MCH 28.6 27.0 - 34.0 pg    MCHC 32.4 32.0 - 36.0 g/dL    RDW 13.4 11.0 - 14.5 %    Platelets 222 140 - 440 thou/uL    MPV 9.7 8.5 - 12.5 fL    Neutrophils % 61 50 - 70 %    Lymphocytes % 31 20 - 40 %    Monocytes % 7 2 - 10 %    Eosinophils % 1 0 - 6 %    Basophils % 0 0 - 2 %    Neutrophils Absolute 4.0 2.0 - 7.7 thou/uL    Lymphocytes Absolute 2.0 0.8 - 4.4 thou/uL    Monocytes Absolute 0.5 0.0 - 0.9 thou/uL    Eosinophils Absolute 0.1 0.0 - 0.4 thou/uL    Basophils Absolute 0.0 0.0 - 0.2 thou/uL   Hepatitis B Surface Antigen (HBsAG)   Result Value Ref Range    Hepatitis B Surface Ag Negative Negative   Hepatitis C Antibody (Anti-HCV)   Result Value Ref Range    Hepatitis C Ab Negative Negative   Hepatitis B Chronic Evaluation   Result Value Ref Range    Hep B Core Total Ab Negative Negative    Hep B Surface Antibody Positive (!) Negative   PSA, Annual Screen (Prostatic-Specific Antigen)   Result Value Ref Range    PSA 1.9 0.0 - 2.5 ng/mL       Imaging    Ct Chest Abdomen Pelvis With Oral With Iv Cont    Result Date: 5/3/2019  EXAM: CT CHEST ABDOMEN PELVIS W ORAL W IV CONTRAST LOCATION: [facility] DATE/TIME: 5/3/2019 10:03 AM INDICATION: Neoplasm: testicular, recurrence, suspected/known Patient with a history of testicular cancer resected in 2005. Now with bilateral lung infiltrates and LFT elevation. COMPARISON: 3/27/2019 chest CT. TECHNIQUE: Helical thin-section CT scan of the chest, abdomen, and pelvis was performed before and after injection of IV contrast. Multiplanar reformats were obtained. Dose reduction techniques were used. CONTRAST: Iohexol (Omni) 100 mL. FINDINGS: CHEST: Prior bilateral irregular pulmonary opacities have essentially resolved (a few subtle regions of groundglass remain). No new pulmonary parenchymal abnormality. No adenopathy. No effusions.  ABDOMEN: Low-density liver without focal abnormality. Unremarkable  gallbladder, pancreas, spleen and adrenals. No free air or adenopathy. Nonaneurysmal aorta with trace plaque. PELVIS: Colonic diverticulosis. Normal appendix. No free fluid or adenopathy. MUSCULOSKELETAL: No focal bony lesion. Couple tiny sclerotic foci are likely bone islands.     CONCLUSION: 1.  Near-complete resolution of prior presumed infectious / inflammatory pulmonary opacities. 2.  No adenopathy in the chest, abdomen or pelvis. 3.  Diffuse hepatic steatosis. No focal lesion.         Signed by: Paula Nova MD

## 2021-06-02 VITALS — BODY MASS INDEX: 35.37 KG/M2 | WEIGHT: 261.13 LBS | HEIGHT: 72 IN

## 2021-06-02 VITALS — WEIGHT: 257.5 LBS | HEIGHT: 72 IN | BODY MASS INDEX: 34.88 KG/M2

## 2021-06-03 VITALS — WEIGHT: 263.6 LBS | BODY MASS INDEX: 35.75 KG/M2

## 2021-06-03 VITALS
HEART RATE: 93 BPM | BODY MASS INDEX: 35.58 KG/M2 | SYSTOLIC BLOOD PRESSURE: 120 MMHG | DIASTOLIC BLOOD PRESSURE: 80 MMHG | RESPIRATION RATE: 16 BRPM | WEIGHT: 262.38 LBS | OXYGEN SATURATION: 98 %

## 2021-06-03 NOTE — PROGRESS NOTES
"Chief Complaint   Patient presents with     anal problem     Pt c/o growth at anus, denies pain or blood x 3 months     Adenopathy     Pt c/o lymphode swollen, chiropractor has felt it in his last 4 visits.       HPI: 43-year-old male with a past history of testicular cancer, and elevated LFTs, presents today with 2 issues.  The first is a \"mass\" in his rectum.  He is noticed that for the past several months.  Occasionally has rectal bleeding with defecation but not all the time.    He also has noticed increased fatigue.  Over the past 2 months he states \"I just have not felt myself\".  He can put his finger on it but he tells me that he feels fatigued.  He is taking over-the-counter allergy medicines including Allegra which she has done for quite some time.  He is concerned that it may be cancer related.  He would like his labs to be checked.  He has a past history of testicular cancer and is currently taking testosterone injections.    ROS: No abdominal pains.  No difficulty moving his urine.  Seasonal allergies are stable    SH:    reports that he has quit smoking. His smoking use included cigarettes. He has a 0.20 pack-year smoking history. He has never used smokeless tobacco. He reports that he does not drink alcohol or use drugs.      FH: The Patient's family history includes Arthritis in his mother; Heart disease in his father; No Medical Problems in his sister, sister, son, and son; Other (age of onset: 13) in his daughter.     Meds:  Allan has a current medication list which includes the following prescription(s): testosterone cypionate and hydrocortisone.    O:  /80   Pulse 93   Resp 16   Wt (!) 262 lb 6 oz (119 kg)   SpO2 98%   BMI 35.58 kg/m    Alert conversant no acute distress  Skin pink and dry  Neck is supple with no thyromegaly  Examination rectal area shows 2 nonthrombosed external hemorrhoids 1-7 o'clock and one at the 4 o'clock position which is minimally painful there is no active " bleeding.    A/P:   1. External hemorrhoids  Patient has external hemorrhoids.  Recommended Anusol HC cream.  Sitz bath.  Refrain from sitting on the toilet extended periods of time.  Discussed early colonoscopy at this point patient will hold.  - hydrocortisone (ANUSOL-HC) 2.5 % rectal cream; Apply rectally 2 times daily  Dispense: 30 g; Refill: 1    2. Chronic fatigue  We will check labs as listed.  - HM2(CBC w/o Differential)  - Basic Metabolic Panel  - Thyroid Stimulating Hormone (TSH)    3.  Seasonal allergies  -Continue Allegra      RTC 6 months

## 2021-06-03 NOTE — TELEPHONE ENCOUNTER
Left message to call back for: pt.  Information to relay to patient:  Please relay normal results. Letter mailed.

## 2021-06-03 NOTE — TELEPHONE ENCOUNTER
----- Message from Sharona Larry MD sent at 11/21/2019 10:33 AM CST -----  Please notify patient that his laboratory results are normal.     Thank you,    Dr. Larry

## 2021-06-04 VITALS
HEART RATE: 84 BPM | SYSTOLIC BLOOD PRESSURE: 136 MMHG | TEMPERATURE: 98.5 F | DIASTOLIC BLOOD PRESSURE: 88 MMHG | BODY MASS INDEX: 34.99 KG/M2 | WEIGHT: 258 LBS | RESPIRATION RATE: 22 BRPM

## 2021-06-09 NOTE — PROGRESS NOTES
Chief Complaint   Patient presents with     Rectal Pain     last week anal pain started to flare up, started using medication for Nov - but dosent seem to be helping to much, urination seems just a little different unable to explain well        HPI: Patient presents with rectal pain.  Patient was seen late last year and diagnosed with hemorrhoids.  Was given Anusol HC cream and he notes it helped somewhat but now, since approximately 4 days ago, has had increased rectal pain difficulty voiding and feeling of fullness in his bladder.    ROS: No melena or hematochezia.  No fever.    SH: Reviewed-see Snapshot for review.     FH: Reviewed-see Snapshot for review.    Meds:  Allan has a current medication list which includes the following prescription(s): hydrocortisone and testosterone cypionate.    O:  /88   Pulse 84   Temp 98.5  F (36.9  C) (Oral)   Resp 22   Wt (!) 258 lb (117 kg)   BMI 34.99 kg/m    Constitutional:    --Vitals as above  --No acute distress  Eyes-  --Sclera noninjected  --Lids and conjunctiva normal  GI-  -Examination of the rectum shows a 2 x 4 cm hemorrhoid obstructing the anal canal  Skin-  --Pink and dry    A/P:   1. Hemorrhoids, unspecified hemorrhoid type  Patient continues to have problems with hemorrhoids and we will have him see colorectal surgery for possible excision.  Continue Anusol HC cream and sitz bath.  Continue with stool softeners.  - Ambulatory referral to Colorectal Surgery

## 2021-06-09 NOTE — TELEPHONE ENCOUNTER
Outreach to patient per Dr Larry's request.   Can patient come in on Thursday instead of tomorrow? There was a scheduling error and he doesn't have time to see him tomorrow unfortunately.

## 2021-06-16 PROBLEM — R74.8 ELEVATED LIVER ENZYMES: Status: ACTIVE | Noted: 2019-04-10

## 2021-06-16 PROBLEM — Z85.47 HISTORY OF PRIMARY TESTICULAR CANCER: Status: ACTIVE | Noted: 2019-04-10

## 2021-06-16 PROBLEM — R91.8 LUNG INFILTRATE ON CT: Status: ACTIVE | Noted: 2019-04-10

## 2021-06-19 NOTE — LETTER
Letter by Anjali Christensen RN at      Author: Anjali Christensen RN Service: -- Author Type: --    Filed:  Encounter Date: 3/28/2019 Status: (Other)       Dear Allan,    Thank you for choosing [facility] for your care.  We are committed to providing you with the highest quality and compassionate healthcare services.  The following information pertains to your first appointment with our clinic.    Date/Time of appointment: Monday, 4/8/19, 10:30 AM      Name of your Physician: Dr. Oglesby    What to bring to your appointment:      Completed Patient History/Initial Nursing Assessment and Medication/Allergy List (these forms were sent to you).    Any paperwork or films from your physician that we have asked you to bring.    Your current insurance card(s).    Parking:    Please refer to the map included to direct you.  The [facility] is located at the [address] end of [facility] in [place], [place].      After turning onto [address] from [address], take a right turn at the first stop sign.  We have designated parking on the left, identified as parking for Cancer Care patients (Lot D).     The Code to Enter Lot D is: 0401. This code changes monthly and will always coincide with the current month followed by 01. For example August will be 0801.  The month will continue to change but the 01 will remain constant.  If lot D is full please use Parking Lot A, directly across the street.    Please enter the Cancer Care Center on the north end of the [facility].  You will see a sign on the building.        For Medical Oncology or Hematology appointments, please take the elevator to the second floor to check in.   For Radiation Oncology appointments, please go straight through the double doors and check in.     Also please note appointments can last 1.5-2 hours.      We hope these instructions are helpful to you.  If you have any questions or concerns, please call us at [phone].  It is our  pleasure to assist you.    Warm Regards,  Chelita Christensen, RN  Nurse Navigator  [phone]

## 2021-06-20 ENCOUNTER — HEALTH MAINTENANCE LETTER (OUTPATIENT)
Age: 46
End: 2021-06-20

## 2021-06-20 NOTE — LETTER
Letter by Paula Nova MD at      Author: Paula Nova MD Service: -- Author Type: --    Filed:  Encounter Date: 5/11/2020 Status: (Other)                   Office Hours: Monday - Friday 8:00 - 4:30PM    Allan Moran  [address] [address] [place] [id number]           May 11, 2020      Dear Allan:    It looks as though you are due to see Dr. Nova. If you would like to schedule this appointment please call [phone]         Sincerely,        [facility]

## 2021-06-20 NOTE — LETTER
Letter by Paula Nova MD at      Author: Paula Nova MD Service: -- Author Type: --    Filed:  Encounter Date: 3/20/2020 Status: (Other)                   Office Hours: Monday - Friday 8:00 - 4:30PM    Allan Moran  [address] [facility] [id number]           March 20, 2020      Dear Allan:    It looks as though you are due to see Dr. Nova in May. If you would like to schedule this follow up please call [phone]         Sincerely,        [facility]

## 2021-10-10 ENCOUNTER — HEALTH MAINTENANCE LETTER (OUTPATIENT)
Age: 46
End: 2021-10-10

## 2022-07-16 ENCOUNTER — HEALTH MAINTENANCE LETTER (OUTPATIENT)
Age: 47
End: 2022-07-16

## 2022-09-18 ENCOUNTER — HEALTH MAINTENANCE LETTER (OUTPATIENT)
Age: 47
End: 2022-09-18

## 2023-07-30 ENCOUNTER — HEALTH MAINTENANCE LETTER (OUTPATIENT)
Age: 48
End: 2023-07-30